# Patient Record
Sex: MALE | Race: WHITE | NOT HISPANIC OR LATINO | Employment: FULL TIME | URBAN - NONMETROPOLITAN AREA
[De-identification: names, ages, dates, MRNs, and addresses within clinical notes are randomized per-mention and may not be internally consistent; named-entity substitution may affect disease eponyms.]

---

## 2024-03-21 ENCOUNTER — OFFICE VISIT (OUTPATIENT)
Dept: URGENT CARE | Facility: CLINIC | Age: 29
End: 2024-03-21
Payer: COMMERCIAL

## 2024-03-21 VITALS
RESPIRATION RATE: 18 BRPM | TEMPERATURE: 97.9 F | HEIGHT: 73 IN | BODY MASS INDEX: 25.71 KG/M2 | OXYGEN SATURATION: 97 % | HEART RATE: 86 BPM | SYSTOLIC BLOOD PRESSURE: 118 MMHG | WEIGHT: 194 LBS | DIASTOLIC BLOOD PRESSURE: 70 MMHG

## 2024-03-21 DIAGNOSIS — H92.01 RIGHT EAR PAIN: ICD-10-CM

## 2024-03-21 DIAGNOSIS — H61.23 BILATERAL IMPACTED CERUMEN: Primary | ICD-10-CM

## 2024-03-21 PROCEDURE — 69210 REMOVE IMPACTED EAR WAX UNI: CPT | Performed by: PHYSICIAN ASSISTANT

## 2024-03-21 PROCEDURE — 3078F DIAST BP <80 MM HG: CPT | Performed by: PHYSICIAN ASSISTANT

## 2024-03-21 PROCEDURE — 3074F SYST BP LT 130 MM HG: CPT | Performed by: PHYSICIAN ASSISTANT

## 2024-03-21 RX ORDER — TACROLIMUS 1 MG/1
1 CAPSULE ORAL 2 TIMES DAILY
COMMUNITY

## 2024-03-21 RX ORDER — MYCOPHENOLATE MOFETIL 250 MG/1
250 CAPSULE ORAL 2 TIMES DAILY
COMMUNITY

## 2024-03-21 ASSESSMENT — ENCOUNTER SYMPTOMS
FEVER: 0
COUGH: 0

## 2024-03-21 NOTE — PROGRESS NOTES
"Subjective     Apollo De Leon III is a 28 y.o. male who presents with Ear Fullness (Right ear )    PMH:  has no past medical history on file.  MEDS:   Current Outpatient Medications:     tacrolimus (PROGRAF) 1 MG Cap, Take 1 mg by mouth 2 times a day., Disp: , Rfl:     mycophenolate (CELLCEPT) 250 MG Cap, Take 250 mg by mouth 2 times a day., Disp: , Rfl:   ALLERGIES: No Known Allergies  SURGHX: History reviewed. No pertinent surgical history.  SOCHX:  Reviewed with patient/family member/EPIC.    FH: Reviewed with patient, not pertinent to this visit.           Patient presents with:  Ear Fullness: Right ear , as well as some pain.  Patient has history of wax buildup in both ears, states he feels he was able to get some out of his left ear, but the right ear is still pretty full.  Patient denies decreased hearing just fullness in the little bit of pain on the right.  No other complaints.    Ear Fullness  This is a new problem. The current episode started in the past 7 days. The problem occurs constantly. The problem has been unchanged. Pertinent negatives include no congestion, coughing or fever. Nothing aggravates the symptoms. He has tried nothing for the symptoms. The treatment provided no relief.       Review of Systems   Constitutional:  Negative for fever.   HENT:  Positive for ear pain. Negative for congestion, ear discharge, hearing loss and tinnitus.    Respiratory:  Negative for cough.    All other systems reviewed and are negative.             Objective     /70   Pulse 86   Temp 36.6 °C (97.9 °F) (Temporal)   Resp 18   Ht 1.854 m (6' 1\")   Wt 88 kg (194 lb)   SpO2 97%   BMI 25.60 kg/m²      Physical Exam  Vitals and nursing note reviewed.   Constitutional:       General: He is not in acute distress.     Appearance: Normal appearance. He is well-developed. He is not ill-appearing or toxic-appearing.   HENT:      Head: Normocephalic and atraumatic.      Right Ear: There is impacted cerumen.      " Left Ear: There is impacted cerumen.      Nose: Nose normal.      Mouth/Throat:      Mouth: Mucous membranes are moist.      Pharynx: Uvula midline.   Eyes:      Extraocular Movements: Extraocular movements intact.      Conjunctiva/sclera: Conjunctivae normal.      Pupils: Pupils are equal, round, and reactive to light.   Cardiovascular:      Rate and Rhythm: Normal rate and regular rhythm.   Pulmonary:      Effort: Pulmonary effort is normal.      Breath sounds: Normal breath sounds.   Abdominal:      Palpations: Abdomen is soft.   Musculoskeletal:         General: Normal range of motion.      Cervical back: Normal range of motion.   Skin:     General: Skin is warm and dry.      Capillary Refill: Capillary refill takes less than 2 seconds.   Neurological:      General: No focal deficit present.      Mental Status: He is alert and oriented to person, place, and time.      Coordination: Coordination normal.      Gait: Gait normal.   Psychiatric:         Mood and Affect: Mood normal.         Behavior: Behavior normal. Behavior is cooperative.                             Assessment & Plan        1. Bilateral impacted cerumen      2. Right ear pain            Procedure: Cerumen Removal  Risks and benefits of procedure discussed  Cerumen removed with curette and lavage after softening agent instilled  Patient tolerated well  Post procedure exam with clear canal and normal TM     Differential diagnosis, supportive care, and indications for immediate follow-up discussed with patient.  Instructed to return to clinic or nearest emergency department for any change in condition, further concerns, or worsening of symptoms.    I personally reviewed prior external notes and test results pertinent to today's visit.  I have independently reviewed and interpreted all diagnostics ordered during this urgent care visit.    PT should follow up with PCP in 1-2 days for re-evaluation if symptoms have not improved.      Discussed red flags  and reasons to return to UC or ED.      Pt and/or family verbalized understanding of diagnosis and follow up instructions and was offered informational handout on diagnosis.  PT discharged.     Please note that this dictation was created using voice recognition software. I have made every reasonable attempt to correct obvious errors, but I expect that there may be errors of grammar and possibly content that I did not discover before finalizing the note.

## 2024-06-07 ENCOUNTER — OFFICE VISIT (OUTPATIENT)
Dept: URGENT CARE | Facility: CLINIC | Age: 29
End: 2024-06-07
Payer: COMMERCIAL

## 2024-06-07 ENCOUNTER — APPOINTMENT (OUTPATIENT)
Dept: RADIOLOGY | Facility: IMAGING CENTER | Age: 29
End: 2024-06-07
Attending: PHYSICIAN ASSISTANT
Payer: COMMERCIAL

## 2024-06-07 VITALS
RESPIRATION RATE: 16 BRPM | OXYGEN SATURATION: 96 % | BODY MASS INDEX: 24.52 KG/M2 | DIASTOLIC BLOOD PRESSURE: 70 MMHG | TEMPERATURE: 97.9 F | SYSTOLIC BLOOD PRESSURE: 126 MMHG | HEART RATE: 92 BPM | HEIGHT: 73 IN | WEIGHT: 185 LBS

## 2024-06-07 DIAGNOSIS — J06.9 VIRAL URI WITH COUGH: ICD-10-CM

## 2024-06-07 DIAGNOSIS — R05.1 ACUTE COUGH: ICD-10-CM

## 2024-06-07 PROCEDURE — 71046 X-RAY EXAM CHEST 2 VIEWS: CPT | Mod: TC | Performed by: PHYSICIAN ASSISTANT

## 2024-06-07 PROCEDURE — 3078F DIAST BP <80 MM HG: CPT | Performed by: PHYSICIAN ASSISTANT

## 2024-06-07 PROCEDURE — 99214 OFFICE O/P EST MOD 30 MIN: CPT | Performed by: PHYSICIAN ASSISTANT

## 2024-06-07 PROCEDURE — 3074F SYST BP LT 130 MM HG: CPT | Performed by: PHYSICIAN ASSISTANT

## 2024-06-07 RX ORDER — BENZONATATE 100 MG/1
100 CAPSULE ORAL 3 TIMES DAILY PRN
Qty: 21 CAPSULE | Refills: 0 | Status: SHIPPED | OUTPATIENT
Start: 2024-06-07

## 2024-06-07 NOTE — PROGRESS NOTES
"Subjective:   Apollo De Leon III is a 28 y.o. male who presents for Cough (Patient coming for cough congestion, fever )      HPI  The patient presents to the Urgent Care with complaints of a cough onset 1.5 weeks ago.  Had associated chest congestion and some productive cough but this is clearing up.  Coughs up some mucus.  Also had some concern for low-grade fevers since then.  99.6 F.  Temperature always below 100 F.  Reports of fatigue and brain fog.  He had 2 at home COVID test which were negative.  States this feels like COVID.  History of COVID without complications or hospitalization.  He had a heart transplant 7 years ago and is on organ rejection medications.  Sometimes feels like he cannot take a deep breath.  He had diarrhea which resolved.  Some nausea. Denies any chest pain, vomiting. Tolerating fluids well. Decreased appetite. Does not smoke cigarettes. Other family members have a cough.           No past medical history on file.  No Known Allergies     Objective:     /70   Pulse 92   Temp 36.6 °C (97.9 °F) (Temporal)   Resp 16   Ht 1.854 m (6' 1\")   Wt 83.9 kg (185 lb)   SpO2 96%   BMI 24.41 kg/m²     Physical Exam  Vitals reviewed.   Constitutional:       General: He is not in acute distress.     Appearance: Normal appearance. He is not ill-appearing or toxic-appearing.   HENT:      Mouth/Throat:      Mouth: Mucous membranes are moist.      Pharynx: Oropharynx is clear. No oropharyngeal exudate or posterior oropharyngeal erythema.   Eyes:      Conjunctiva/sclera: Conjunctivae normal.   Cardiovascular:      Rate and Rhythm: Normal rate and regular rhythm.      Heart sounds: Normal heart sounds.   Pulmonary:      Effort: Pulmonary effort is normal. No respiratory distress.      Breath sounds: Normal breath sounds. No wheezing, rhonchi or rales.   Musculoskeletal:      Cervical back: Neck supple. No rigidity.   Skin:     General: Skin is warm and dry.   Neurological:      General: No focal " deficit present.      Mental Status: He is alert and oriented to person, place, and time.   Psychiatric:         Mood and Affect: Mood normal.         Behavior: Behavior normal.         RADIOLOGY RESULTS   DX-CHEST-2 VIEWS    Result Date: 6/7/2024 6/7/2024 3:23 PM HISTORY/REASON FOR EXAM:  Cough. TECHNIQUE/EXAM DESCRIPTION AND NUMBER OF VIEWS: Two views of the chest. COMPARISON:  None. FINDINGS: Normal heart size. Sternal wires. Embolization coils. No infiltrate or pleural effusion.     No active disease.       Diagnosis and associated orders:     1. Viral URI with cough  - DX-CHEST-2 VIEWS; Future  - benzonatate (TESSALON) 100 MG Cap; Take 1 Capsule by mouth 3 times a day as needed for Cough.  Dispense: 21 Capsule; Refill: 0       Comments/MDM:     X-ray results per radiologist interpretation above. I personally reviewed images and radiologist report  The patient's presenting symptoms and exam findings are consistent with a upper respiratory infection most likely viral etiology. They have a normal pulse oximetry on room air, afebrile, and a normal pulmonary exam. Overall, the patient is very well appearing. I do not feel that this patient would benefit from antibiotics at this time.   Patient politely declined COVID, Influenza, and RSV testing at this time as if will not .   Recommended symptomatic and supportive care at this time that includes plenty of fluids, rest, Tylenol/Ibuprofen for pain/fever, warm salt water gargles for sore throat, OTC cough and decongestant medication, Flonase, nasal saline washes. If no improvement in 5-7 days or any worsening symptoms, recommend returning to the urgent care for re-evaluation.        I personally reviewed prior external notes and test results pertinent to today's visit. Pathogenesis of diagnosis discussed including typical length and natural progression. Supportive care, natural history, differential diagnoses, and indications for immediate follow-up  discussed. Patient expresses understanding and agrees to plan. Patient denies any other questions or concerns.     Follow-up with the primary care physician for recheck, reevaluation, and consideration of further management.    Please note that this dictation was created using voice recognition software. I have made a reasonable attempt to correct obvious errors, but I expect that there are errors of grammar and possibly content that I did not discover before finalizing the note.    This note was electronically signed by Grayson Foreman PA-C

## 2024-06-10 ENCOUNTER — APPOINTMENT (OUTPATIENT)
Dept: RADIOLOGY | Facility: MEDICAL CENTER | Age: 29
End: 2024-06-10
Attending: EMERGENCY MEDICINE
Payer: COMMERCIAL

## 2024-06-10 ENCOUNTER — HOSPITAL ENCOUNTER (EMERGENCY)
Facility: MEDICAL CENTER | Age: 29
End: 2024-06-11
Attending: EMERGENCY MEDICINE
Payer: COMMERCIAL

## 2024-06-10 DIAGNOSIS — R05.1 ACUTE COUGH: ICD-10-CM

## 2024-06-10 DIAGNOSIS — J18.9 PNEUMONIA OF BOTH LUNGS DUE TO INFECTIOUS ORGANISM, UNSPECIFIED PART OF LUNG: ICD-10-CM

## 2024-06-10 DIAGNOSIS — D84.9 IMMUNOCOMPROMISED (HCC): ICD-10-CM

## 2024-06-10 LAB
ALBUMIN SERPL BCP-MCNC: 4.1 G/DL (ref 3.2–4.9)
ALBUMIN/GLOB SERPL: 1.2 G/DL
ALP SERPL-CCNC: 80 U/L (ref 30–99)
ALT SERPL-CCNC: 18 U/L (ref 2–50)
ANION GAP SERPL CALC-SCNC: 15 MMOL/L (ref 7–16)
AST SERPL-CCNC: 20 U/L (ref 12–45)
BASOPHILS # BLD AUTO: 0.2 % (ref 0–1.8)
BASOPHILS # BLD: 0.02 K/UL (ref 0–0.12)
BILIRUB SERPL-MCNC: 0.7 MG/DL (ref 0.1–1.5)
BUN SERPL-MCNC: 14 MG/DL (ref 8–22)
CALCIUM ALBUM COR SERPL-MCNC: 9 MG/DL (ref 8.5–10.5)
CALCIUM SERPL-MCNC: 9.1 MG/DL (ref 8.5–10.5)
CHLORIDE SERPL-SCNC: 98 MMOL/L (ref 96–112)
CO2 SERPL-SCNC: 20 MMOL/L (ref 20–33)
CREAT SERPL-MCNC: 0.97 MG/DL (ref 0.5–1.4)
EKG IMPRESSION: NORMAL
EOSINOPHIL # BLD AUTO: 0.07 K/UL (ref 0–0.51)
EOSINOPHIL NFR BLD: 0.7 % (ref 0–6.9)
ERYTHROCYTE [DISTWIDTH] IN BLOOD BY AUTOMATED COUNT: 38.8 FL (ref 35.9–50)
FLUAV RNA SPEC QL NAA+PROBE: NEGATIVE
FLUBV RNA SPEC QL NAA+PROBE: NEGATIVE
GFR SERPLBLD CREATININE-BSD FMLA CKD-EPI: 108 ML/MIN/1.73 M 2
GLOBULIN SER CALC-MCNC: 3.5 G/DL (ref 1.9–3.5)
GLUCOSE SERPL-MCNC: 103 MG/DL (ref 65–99)
HCT VFR BLD AUTO: 44.7 % (ref 42–52)
HGB BLD-MCNC: 15.4 G/DL (ref 14–18)
IMM GRANULOCYTES # BLD AUTO: 0.04 K/UL (ref 0–0.11)
IMM GRANULOCYTES NFR BLD AUTO: 0.4 % (ref 0–0.9)
LACTATE SERPL-SCNC: 1.2 MMOL/L (ref 0.5–2)
LYMPHOCYTES # BLD AUTO: 0.92 K/UL (ref 1–4.8)
LYMPHOCYTES NFR BLD: 9.2 % (ref 22–41)
MCH RBC QN AUTO: 30.3 PG (ref 27–33)
MCHC RBC AUTO-ENTMCNC: 34.5 G/DL (ref 32.3–36.5)
MCV RBC AUTO: 87.8 FL (ref 81.4–97.8)
MONOCYTES # BLD AUTO: 0.84 K/UL (ref 0–0.85)
MONOCYTES NFR BLD AUTO: 8.4 % (ref 0–13.4)
NEUTROPHILS # BLD AUTO: 8.16 K/UL (ref 1.82–7.42)
NEUTROPHILS NFR BLD: 81.1 % (ref 44–72)
NRBC # BLD AUTO: 0 K/UL
NRBC BLD-RTO: 0 /100 WBC (ref 0–0.2)
NT-PROBNP SERPL IA-MCNC: 61 PG/ML (ref 0–125)
PLATELET # BLD AUTO: 249 K/UL (ref 164–446)
PMV BLD AUTO: 10 FL (ref 9–12.9)
POTASSIUM SERPL-SCNC: 3.5 MMOL/L (ref 3.6–5.5)
PROT SERPL-MCNC: 7.6 G/DL (ref 6–8.2)
RBC # BLD AUTO: 5.09 M/UL (ref 4.7–6.1)
RSV RNA SPEC QL NAA+PROBE: NEGATIVE
SARS-COV-2 RNA RESP QL NAA+PROBE: NOTDETECTED
SODIUM SERPL-SCNC: 133 MMOL/L (ref 135–145)
TROPONIN T SERPL-MCNC: <6 NG/L (ref 6–19)
WBC # BLD AUTO: 10.1 K/UL (ref 4.8–10.8)

## 2024-06-10 PROCEDURE — 87040 BLOOD CULTURE FOR BACTERIA: CPT

## 2024-06-10 PROCEDURE — 84484 ASSAY OF TROPONIN QUANT: CPT

## 2024-06-10 PROCEDURE — 96375 TX/PRO/DX INJ NEW DRUG ADDON: CPT

## 2024-06-10 PROCEDURE — 80053 COMPREHEN METABOLIC PANEL: CPT

## 2024-06-10 PROCEDURE — 96365 THER/PROPH/DIAG IV INF INIT: CPT

## 2024-06-10 PROCEDURE — 700117 HCHG RX CONTRAST REV CODE 255: Performed by: EMERGENCY MEDICINE

## 2024-06-10 PROCEDURE — 93005 ELECTROCARDIOGRAM TRACING: CPT

## 2024-06-10 PROCEDURE — 0241U HCHG SARS-COV-2 COVID-19 NFCT DS RESP RNA 4 TRGT ED POC: CPT

## 2024-06-10 PROCEDURE — 71045 X-RAY EXAM CHEST 1 VIEW: CPT

## 2024-06-10 PROCEDURE — 99285 EMERGENCY DEPT VISIT HI MDM: CPT

## 2024-06-10 PROCEDURE — 36415 COLL VENOUS BLD VENIPUNCTURE: CPT

## 2024-06-10 PROCEDURE — 83880 ASSAY OF NATRIURETIC PEPTIDE: CPT

## 2024-06-10 PROCEDURE — A9270 NON-COVERED ITEM OR SERVICE: HCPCS | Performed by: EMERGENCY MEDICINE

## 2024-06-10 PROCEDURE — 93005 ELECTROCARDIOGRAM TRACING: CPT | Performed by: EMERGENCY MEDICINE

## 2024-06-10 PROCEDURE — 83605 ASSAY OF LACTIC ACID: CPT

## 2024-06-10 PROCEDURE — 71275 CT ANGIOGRAPHY CHEST: CPT

## 2024-06-10 PROCEDURE — 700102 HCHG RX REV CODE 250 W/ 637 OVERRIDE(OP): Performed by: EMERGENCY MEDICINE

## 2024-06-10 PROCEDURE — 700111 HCHG RX REV CODE 636 W/ 250 OVERRIDE (IP): Performed by: EMERGENCY MEDICINE

## 2024-06-10 PROCEDURE — 85025 COMPLETE CBC W/AUTO DIFF WBC: CPT

## 2024-06-10 PROCEDURE — 87496 CYTOMEG DNA AMP PROBE: CPT

## 2024-06-10 RX ORDER — OXYCODONE HYDROCHLORIDE AND ACETAMINOPHEN 5; 325 MG/1; MG/1
1 TABLET ORAL ONCE
Status: COMPLETED | OUTPATIENT
Start: 2024-06-10 | End: 2024-06-10

## 2024-06-10 RX ORDER — AZITHROMYCIN 500 MG/1
500 INJECTION, POWDER, LYOPHILIZED, FOR SOLUTION INTRAVENOUS ONCE
Status: COMPLETED | OUTPATIENT
Start: 2024-06-10 | End: 2024-06-10

## 2024-06-10 RX ORDER — DOXYCYCLINE 100 MG/1
100 CAPSULE ORAL 2 TIMES DAILY
Qty: 10 CAPSULE | Refills: 0 | Status: ACTIVE | OUTPATIENT
Start: 2024-06-10 | End: 2024-06-11

## 2024-06-10 RX ORDER — AMOXICILLIN AND CLAVULANATE POTASSIUM 875; 125 MG/1; MG/1
1 TABLET, FILM COATED ORAL 2 TIMES DAILY
Qty: 14 TABLET | Refills: 0 | Status: ACTIVE | OUTPATIENT
Start: 2024-06-10 | End: 2024-06-11

## 2024-06-10 RX ORDER — CEFTRIAXONE 2 G/1
2000 INJECTION, POWDER, FOR SOLUTION INTRAMUSCULAR; INTRAVENOUS ONCE
Status: COMPLETED | OUTPATIENT
Start: 2024-06-10 | End: 2024-06-10

## 2024-06-10 RX ORDER — AZITHROMYCIN 500 MG/5ML
500 INJECTION, POWDER, LYOPHILIZED, FOR SOLUTION INTRAVENOUS EVERY 24 HOURS
Status: DISCONTINUED | OUTPATIENT
Start: 2024-06-11 | End: 2024-06-10

## 2024-06-10 RX ADMIN — CEFTRIAXONE SODIUM 2000 MG: 2 INJECTION, POWDER, FOR SOLUTION INTRAMUSCULAR; INTRAVENOUS at 22:51

## 2024-06-10 RX ADMIN — AZITHROMYCIN MONOHYDRATE 500 MG: 500 INJECTION, POWDER, LYOPHILIZED, FOR SOLUTION INTRAVENOUS at 22:55

## 2024-06-10 RX ADMIN — IOHEXOL 50 ML: 350 INJECTION, SOLUTION INTRAVENOUS at 21:40

## 2024-06-10 RX ADMIN — OXYCODONE AND ACETAMINOPHEN 1 TABLET: 5; 325 TABLET ORAL at 20:58

## 2024-06-11 VITALS
SYSTOLIC BLOOD PRESSURE: 120 MMHG | TEMPERATURE: 98.7 F | RESPIRATION RATE: 16 BRPM | BODY MASS INDEX: 24.78 KG/M2 | WEIGHT: 182.98 LBS | DIASTOLIC BLOOD PRESSURE: 70 MMHG | HEIGHT: 72 IN | OXYGEN SATURATION: 95 % | HEART RATE: 87 BPM

## 2024-06-11 RX ORDER — AMOXICILLIN AND CLAVULANATE POTASSIUM 875; 125 MG/1; MG/1
1 TABLET, FILM COATED ORAL 2 TIMES DAILY
Qty: 14 TABLET | Refills: 0 | Status: ACTIVE | OUTPATIENT
Start: 2024-06-11 | End: 2024-06-18

## 2024-06-11 RX ORDER — DOXYCYCLINE 100 MG/1
100 CAPSULE ORAL 2 TIMES DAILY
Qty: 10 CAPSULE | Refills: 0 | Status: ACTIVE | OUTPATIENT
Start: 2024-06-11 | End: 2024-06-16

## 2024-06-11 NOTE — ED NOTES
Bedside report received from off going RN: [place], assumed care of patient.  POC discussed with patient. Call light within reach, all needs addressed at this time.       Fall risk interventions in place: Patient's personal possessions are with in their safe reach and Keep floor surfaces clean and dry (all applicable per [place] Fall risk assessment)   Continuous monitoring: Cardiac Leads, Pulse Ox, or Blood Pressure  IVF/IV medications: Not Applicable   Oxygen: Room Air  Bedside sitter: Not Applicable   Isolation: Not Applicable

## 2024-06-11 NOTE — ED NOTES
Pt discharged to home with steady gait.  Pt alert and oriented times 4 on room air.  IV discontinued and gauze placed, pt in possession of belongings.  Pt provided discharge education and information pertaining to medications and follow up appointments.  Pt received copy of discharge instructions and verbalized understanding. Encouraged to follow up with PCP. /70   Pulse 87   Temp 37.1 °C (98.7 °F) (Temporal)   Resp 16   Ht 1.829 m (6')   Wt 83 kg (182 lb 15.7 oz)   SpO2 95%   BMI 24.82 kg/m²

## 2024-06-11 NOTE — DISCHARGE SUMMARY
ED Observation Discharge Summary    Patient:Apollo De Leon III  Patient : 1995  Patient MRN: [id number]  Patient PCP: No primary care provider on file.    Admit Date: 6/10/2024  Discharge Date and Time: 06/10/24 10:41 PM  Discharge Diagnosis: 1. Bilateral pneumonia 2.  Immunocompromised  Discharge Attending: Kong Evans M.D.  Discharge Service: ED Observation    ED Course  Apollo is a 28 y.o. male who was evaluated at [facility] by my partner Dr. Gonzalez.  The patient was signed out to my care pending his CT scan.  CT scan does show bilateral lower lobe pneumonia as well as right middle lobe pneumonia.  Therefore the patient will receive Rocephin and azithromycin.  Due to the potential of prolonged QT interval with azithromycin and immunosuppressive's the patient be discharged home on Augmentin and doxycycline.  The patient will return for increased work of breathing.  He is not hypoxic nor does he appear toxic.  Otherwise he will follow-up with [place] [facility].    Discharge Exam:  /78   Pulse (!) 105   Temp 36.7 °C (98 °F) (Temporal)   Resp 16   Ht 1.829 m (6')   Wt 83 kg (182 lb 15.7 oz)   SpO2 93%   BMI 24.82 kg/m² .    Constitutional: Awake and alert. Nontoxic  HENT:  Grossly normal  Eyes: Grossly normal  Neck: Normal range of motion  Cardiovascular: Normal heart rate   Thorax & Lungs: No respiratory distress  Abdomen: Nontender  Skin:  No pathologic rash.   Extremities: Well perfused  Psychiatric: Affect normal    Labs  Results for orders placed or performed during the hospital encounter of 06/10/24   CMV By PCR,Qual   Result Value Ref Range    CMV Source urine    CBC WITH DIFFERENTIAL   Result Value Ref Range    WBC 10.1 4.8 - 10.8 K/uL    RBC 5.09 4.70 - 6.10 M/uL    Hemoglobin 15.4 14.0 - 18.0 g/dL    Hematocrit 44.7 42.0 - 52.0 %    MCV 87.8 81.4 - 97.8 fL    MCH 30.3 27.0 - 33.0 pg    MCHC 34.5 32.3 - 36.5 g/dL    RDW 38.8 35.9 - 50.0 fL     Platelet Count 249 164 - 446 K/uL    MPV 10.0 9.0 - 12.9 fL    Neutrophils-Polys 81.10 (H) 44.00 - 72.00 %    Lymphocytes 9.20 (L) 22.00 - 41.00 %    Monocytes 8.40 0.00 - 13.40 %    Eosinophils 0.70 0.00 - 6.90 %    Basophils 0.20 0.00 - 1.80 %    Immature Granulocytes 0.40 0.00 - 0.90 %    Nucleated RBC 0.00 0.00 - 0.20 /100 WBC    Neutrophils (Absolute) 8.16 (H) 1.82 - 7.42 K/uL    Lymphs (Absolute) 0.92 (L) 1.00 - 4.80 K/uL    Monos (Absolute) 0.84 0.00 - 0.85 K/uL    Eos (Absolute) 0.07 0.00 - 0.51 K/uL    Baso (Absolute) 0.02 0.00 - 0.12 K/uL    Immature Granulocytes (abs) 0.04 0.00 - 0.11 K/uL    NRBC (Absolute) 0.00 K/uL   COMP METABOLIC PANEL   Result Value Ref Range    Sodium 133 (L) 135 - 145 mmol/L    Potassium 3.5 (L) 3.6 - 5.5 mmol/L    Chloride 98 96 - 112 mmol/L    Co2 20 20 - 33 mmol/L    Anion Gap 15.0 7.0 - 16.0    Glucose 103 (H) 65 - 99 mg/dL    Bun 14 8 - 22 mg/dL    Creatinine 0.97 0.50 - 1.40 mg/dL    Calcium 9.1 8.5 - 10.5 mg/dL    Correct Calcium 9.0 8.5 - 10.5 mg/dL    AST(SGOT) 20 12 - 45 U/L    ALT(SGPT) 18 2 - 50 U/L    Alkaline Phosphatase 80 30 - 99 U/L    Total Bilirubin 0.7 0.1 - 1.5 mg/dL    Albumin 4.1 3.2 - 4.9 g/dL    Total Protein 7.6 6.0 - 8.2 g/dL    Globulin 3.5 1.9 - 3.5 g/dL    A-G Ratio 1.2 g/dL   LACTIC ACID   Result Value Ref Range    Lactic Acid 1.2 0.5 - 2.0 mmol/L   TROPONIN   Result Value Ref Range    Troponin T <6 6 - 19 ng/L   proBrain Natriuretic Peptide, NT   Result Value Ref Range    NT-proBNP 61 0 - 125 pg/mL   ESTIMATED GFR   Result Value Ref Range    GFR (CKD-EPI) 108 >60 mL/min/1.73 m 2   EKG   Result Value Ref Range    Report       [facility] Emergency Dept[facility]    Test Date:  2024-06-10  Pt Name:    ROHAN CABAN                   Department: ER  MRN:        [id number]                      Room:  Gender:     Male                         Technician: [id number]  :        1995                   Requested By:ER TRIAGE PROTOCOL  Order #:     [id number]                    Reading MD: ZENY WANG MD    Measurements  Intervals                                Axis  Rate:       96                           P:          70  IN:         148                          QRS:        78  QRSD:       104                          T:          23  QT:         338  QTc:        428    Interpretive Statements  Sinus rhythm rate of 96, narrow QRS, normal IN interval.  Upright T waves.  No ST segment deviation.  My impression of this EKG: No acute ischemia  Electronically Signed On 06- 20:25:03 PDT by ZENY WANG MD     POC CoV-2, FLU A/B, RSV by PCR   Result Value Ref Range    POC Influenza A RNA, PCR Negative Negative    POC Influenza B RNA, PCR Negative Negative    POC RSV, by PCR Negative Negative    POC SARS-CoV-2, PCR NotDetected        Radiology  CT-CTA CHEST PULMONARY ARTERY W/ RECONS   Final Result         1.  No pulmonary embolus appreciated.   2.  Bilateral lower lobe and right middle lobe infiltrate   3.  Irregular hepatic contour, favoring changes of cirrhosis      DX-CHEST-PORTABLE (1 VIEW)   Final Result      Mild right basilar opacity, atelectasis and/or pneumonitis.          Medications:   New Prescriptions    No medications on file       My final assessment includes 1. Bilateral pneumonia 2.  Immunocompromised  Upon Reevaluation, the patient's condition has: Improved; and will be discharged.    Patient discharged from ED Observation status at 2240 (Time) 6/10/24 (Date).     Total time spent on this ED Observation discharge encounter is > 30 Minutes    Electronically signed by: Kong Evans M.D., 6/10/2024 10:41 PM

## 2024-06-11 NOTE — ED TRIAGE NOTES
.  Chief Complaint   Patient presents with    Shortness of Breath     Pt with sob x 3 dayscough, pt. States he has been sick with low grade fever x 4 days, seen in [place] urgent care Saturday, pt. With hx of heart transplant 7 years, advised by md at [place] to come to ED       ./75   Pulse (!) 106   Temp 36.7 °C (98 °F) (Temporal)   Resp 16   Ht 1.829 m (6')   Wt 83 kg (182 lb 15.7 oz)   SpO2 95%   BMI 24.82 kg/m²       .Pt is alert and oriented, speaking in full sentences, follows commands and responds appropriately to questions Resp are even and unlabored.  Skin pink warm and dry     Pt placed in lobby. Pt educated on triage process. Pt encouraged to alert staff for any changes.

## 2024-06-11 NOTE — ED PROVIDER NOTES
ED Provider Note    CHIEF COMPLAINT  Chief Complaint   Patient presents with    Shortness of Breath     Pt with sob x 3 dayscough, pt. States he has been sick with low grade fever x 4 days, seen in [facility] urgent care Saturday, pt. With hx of heart transplant 7 years, advised by md at [facility] to come to ED       EXTERNAL RECORDS REVIEWED  6/7/2024, urgent care, diagnosed with viral URI with cough, chest x-ray clear, prescribed Tessalon    HPI/ROS    Apollo Ozzie De Leon III is a 28 y.o. male who presents for evaluation of fever, shortness of breath and cough, this been going on for the past 2 weeks but he reports significantly worse in the past week.  His fever has never gotten above 100.4 °F.  Very complicated past medical history including a heart transplant on CellCept and Prograf.  He went to urgent care couple days ago, clear chest x-ray, was sent home on some Tessalon.  He does have sick contacts at home with viral like symptoms.  He took 2 negative COVID test at home.  Given the worsening symptoms and ongoing elevated temperature he called his transplant team at [facility], they recommended he come to the ER for further evaluation.    PAST MEDICAL HISTORY       SURGICAL HISTORY  patient denies any surgical history    FAMILY HISTORY  No family history on file.    SOCIAL HISTORY  Social History     Tobacco Use    Smoking status: Not on file    Smokeless tobacco: Not on file   Substance and Sexual Activity    Alcohol use: Not on file    Drug use: Not on file    Sexual activity: Not on file       CURRENT MEDICATIONS  Home Medications    **Home medications have not yet been reviewed for this encounter**         ALLERGIES  No Known Allergies    PHYSICAL EXAM  VITAL SIGNS: /75   Pulse (!) 106   Temp 98.8 degrees oral  Resp 16   Ht 1.829 m (6')   Wt 83 kg (182 lb 15.7 oz)   SpO2 95%   BMI 24.82 kg/m²    Constitutional: Well appearing patient in no acute distress.  Awake and alert, not toxic nor ill  in appearance.  HENT: Normocephalic, no obvious evidence of acute trauma.  Eyes: No scleral icterus. Normal conjunctiva   Thorax & Lungs: Frequent cough otherwise normal nonlabored respirations. I appreciate no wheezing, rhonchi or rales. There is normal air movement.  Upon cardiac ascultation I appreciate a regular heart rhythm and a normal rate.   Abdomen: The abdomen is not visibly distended. Upon palpation, I find it to be without tenderness.  No mass appreciated.  Skin: The exposed portions of skin reveal no obvious rash or other abnormalities.  Extremities/Musculoskeletal: No obvious sign of acute trauma. No asymmetric calf tenderness or edema.   Neurologic: Alert & oriented. No focal deficits observed.      EKG/LABS  Results for orders placed or performed during the hospital encounter of 06/10/24   CMV By PCR,Qual   Result Value Ref Range    CMV Source urine    CBC WITH DIFFERENTIAL   Result Value Ref Range    WBC 10.1 4.8 - 10.8 K/uL    RBC 5.09 4.70 - 6.10 M/uL    Hemoglobin 15.4 14.0 - 18.0 g/dL    Hematocrit 44.7 42.0 - 52.0 %    MCV 87.8 81.4 - 97.8 fL    MCH 30.3 27.0 - 33.0 pg    MCHC 34.5 32.3 - 36.5 g/dL    RDW 38.8 35.9 - 50.0 fL    Platelet Count 249 164 - 446 K/uL    MPV 10.0 9.0 - 12.9 fL    Neutrophils-Polys 81.10 (H) 44.00 - 72.00 %    Lymphocytes 9.20 (L) 22.00 - 41.00 %    Monocytes 8.40 0.00 - 13.40 %    Eosinophils 0.70 0.00 - 6.90 %    Basophils 0.20 0.00 - 1.80 %    Immature Granulocytes 0.40 0.00 - 0.90 %    Nucleated RBC 0.00 0.00 - 0.20 /100 WBC    Neutrophils (Absolute) 8.16 (H) 1.82 - 7.42 K/uL    Lymphs (Absolute) 0.92 (L) 1.00 - 4.80 K/uL    Monos (Absolute) 0.84 0.00 - 0.85 K/uL    Eos (Absolute) 0.07 0.00 - 0.51 K/uL    Baso (Absolute) 0.02 0.00 - 0.12 K/uL    Immature Granulocytes (abs) 0.04 0.00 - 0.11 K/uL    NRBC (Absolute) 0.00 K/uL   COMP METABOLIC PANEL   Result Value Ref Range    Sodium 133 (L) 135 - 145 mmol/L    Potassium 3.5 (L) 3.6 - 5.5 mmol/L    Chloride 98 96 - 112  mmol/L    Co2 20 20 - 33 mmol/L    Anion Gap 15.0 7.0 - 16.0    Glucose 103 (H) 65 - 99 mg/dL    Bun 14 8 - 22 mg/dL    Creatinine 0.97 0.50 - 1.40 mg/dL    Calcium 9.1 8.5 - 10.5 mg/dL    Correct Calcium 9.0 8.5 - 10.5 mg/dL    AST(SGOT) 20 12 - 45 U/L    ALT(SGPT) 18 2 - 50 U/L    Alkaline Phosphatase 80 30 - 99 U/L    Total Bilirubin 0.7 0.1 - 1.5 mg/dL    Albumin 4.1 3.2 - 4.9 g/dL    Total Protein 7.6 6.0 - 8.2 g/dL    Globulin 3.5 1.9 - 3.5 g/dL    A-G Ratio 1.2 g/dL   ESTIMATED GFR   Result Value Ref Range    GFR (CKD-EPI) 108 >60 mL/min/1.73 m 2   EKG   Result Value Ref Range    Report       [facility] Emergency Dept.    Test Date:  2024-06-10  Pt Name:    ROHAN CABAN                   Department: ER  MRN:        [id number]                      Room:  Gender:     Male                         Technician: [id number]  :        [id number]                   Requested By:ER TRIAGE PROTOCOL  Order #:    [id number]                    Reading MD: ZENY WANG MD    Measurements  Intervals                                Axis  Rate:       96                           P:          70  OR:         148                          QRS:        78  QRSD:       104                          T:          23  QT:         338  QTc:        428    Interpretive Statements  Sinus rhythm rate of 96, narrow QRS, normal OR interval.  Upright T waves.  No ST segment deviation.  My impression of this EKG: No acute ischemia  Electronically Signed On 06- 20:25:03 PDT by ZENY WANG MD     POC CoV-2, FLU A/B, RSV by PCR   Result Value Ref Range    POC Influenza A RNA, PCR Negative Negative    POC Influenza B RNA, PCR Negative Negative    POC RSV, by PCR Negative Negative    POC SARS-CoV-2, PCR NotDetected       I have independently interpreted this EKG    RADIOLOGY/PROCEDURES   I have independently interpreted the diagnostic imaging associated with this visit and am waiting the final reading from the radiologist.    My preliminary interpretation is as follows: No pneumothorax    Radiologist interpretation:  DX-CHEST-PORTABLE (1 VIEW)   Final Result      Mild right basilar opacity, atelectasis and/or pneumonitis.      CT-CTA CHEST PULMONARY ARTERY W/ RECONS    (Results Pending)       COURSE & MEDICAL DECISION MAKING    ASSESSMENT, COURSE AND PLAN  Care Narrative: This is a 28-year-old male status post heart transplant on CellCept and Prograf coming in with coughing and malaise over the past 2 weeks, worse for the past week, elevated temperatures albeit never above 100.4 °F.  Reached out to his transplant center and was instructed to come here to the emergency department.  Clear chest x-ray a few days ago.  Had sick contact at home.  Frankly appears generally well on exam, not hypoxic, not febrile.  Initially tachycardic, septic workup will be initiated.  Will obtain viral swabs and chest x-ray.  Will reach out to the transplant team at [facility]    Blood work is reassuring, normal WBC and hemoglobin.  No significant concerning electrolyte abnormalities, normal GFR and LFTs.  Viral swab negative.  BNP and troponin are still pending.  X-ray is read by radiology as atelectasis versus pneumonitis with mild findings in the right base.  I reached out to the on-call physician with the [facility] transplant team, she recommends CT of the chest to further differentiate this abnormality.  If he does in fact have pneumonia he will require antibiotics, if not no antibiotics.  Regardless at this point the patient will be discharged barring any unforeseen abnormality in his troponin or BNP.  The CT scan will be followed up on by my partner Dr. Evans.  The patient will follow-up with the transplant center.  Return instructions already provided            DISPOSITION AND DISCUSSIONS  I have discussed management of the patient with the following physicians and MACEY's:  On-call physician for the transplant team at [facility]    FINAL DIAGNOSIS  1.  Acute cough    2. Viral URI with cough    3. Immunocompromised (HCC)           Electronically signed by: Yehuda Gonzalez M.D., 6/10/2024 6:43 PM

## 2024-06-11 NOTE — ED NOTES
Pt medicated per MAR, education provided.  Pt verbalized understanding. Pt requesting pain meds, ERP aware. ERP at bedside.

## 2024-06-13 LAB
CMV DNA SPEC QL NAA+PROBE: NOT DETECTED
SPECIMEN SOURCE: NORMAL

## 2024-06-15 LAB
BACTERIA BLD CULT: NORMAL
BACTERIA BLD CULT: NORMAL
SIGNIFICANT IND 70042: NORMAL
SIGNIFICANT IND 70042: NORMAL
SITE SITE: NORMAL
SITE SITE: NORMAL
SOURCE SOURCE: NORMAL
SOURCE SOURCE: NORMAL